# Patient Record
(demographics unavailable — no encounter records)

---

## 2025-07-08 NOTE — ASSESSMENT
[FreeTextEntry1] : 31 yo female presenting today with right calcaneal contusion and deltoid ligament sprain. Recommend non-surgical management -RLE WBAT in short cam boot, rx given today -Avoid strenuous/impact related activities -Rest, ice, compression, elevation, NSAIDs PRN for pain. -All questions answered -F/u 6 weeks   The diagnosis was explained in detail. The potential non-surgical and surgical treatments were reviewed. The relative risks and benefits of each option were considered relative to the patients age, activity level, medical history, symptom severity and previously attempted treatments.   The patient was advised to consult with their primary medical provider prior to initiation of any new medications to reduce the risk of adverse effects specific to their long-term home medications and medical history.   The patient's current medications management of their orthopedic diagnosis was reviewed today:   1) We discussed a comprehensive treatment plan that included possible pharmaceutical management involving the use of prescription strength medications including but not limited to options as oral Naprosyn 500mg BID, once daily Meloxicam 15 mg, or 500-650 mg Tylenol versus over-the-counter oral medications and topical prescriptions NSAID Pennsaid vs over the counter Voltaren gel.   2) There is a moderate risk of morbidity with further treatment, especially from use of prescription strength medications and possible side effects of these medications which include upset stomach with oral medications, skin reactions to topical medications and cardiac/renal issues with long term use.   3) I recommended that the patient follow-up with their medical physician to discuss any significant specific potential issues with long term medication use such as interactions with current medications or with exacerbation of underlying medical comorbidities.   4) The benefits and risks associated with use of injectable, oral or topical, prescription and over the counter anti-inflammatory medications were discussed with the patient. The patient voiced understanding of the risks including but not limited to bleeding, stroke, kidney dysfunction, heart disease, and were referred to the black box warning label for further information  Entered by Andres Beck PA-C acting as scribe. Dr. Magdaleno Attestation The documentation recorded by the scribe, in my presence, accurately reflects the service I, Dr. Magdaleno, personally performed, and the decisions made by me with my edits as appropriate.

## 2025-07-08 NOTE — PHYSICAL EXAM
[de-identified] : Examination of the right foot and ankle is as follows: INSPECTION: no swelling, pes cavus deformity, but no abrasion, laceration, no erythema PALPATION: plantar calcaneal tenderness, ttp over deltoid ligament  ROM: plantarflexion 20 degrees, inversion 15 degrees, eversion 10 degrees, dorsiflexion 10 degrees STRENGTH: dorsiflexion 4/5, plantar flexion 4/5, inversion 4/5, eversion 4/5, EHL 5/5, FHL 5/5 VASCULAR: dorsalis pedis pulse: 2+, posterior tibialis pulse: 2+ NEURO: Sensation present to light touch in all distributions GAIT: antalgic, ambulation with cane   X-rays of the right ankle is as follows: [facility] Ankle 3 view AP/Lateral/Oblique: Pes cavus alignment, chronic dorsal navicular avulsion but there are no acute fractures, subluxations or dislocations. No major abnormalities.

## 2025-07-08 NOTE — HISTORY OF PRESENT ILLNESS
[Sudden] : sudden [Dull/Aching] : dull/aching [Throbbing] : throbbing [Intermittent] : intermittent [Household chores] : household chores [Leisure] : leisure [Social interactions] : social interactions [Rest] : rest [7] : 7 [0] : 0 [Sharp] : sharp [de-identified] : Patient here for right ankle. Patient had x-ray at [facility] urgent care on 7/7/25. Patient states she stomped on a balloon and started feeling pain in her Achilles that is sharp with weight bearing and ROM. Patient came into office weight bearing with cane.  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